# Patient Record
Sex: MALE | Race: OTHER | Employment: UNEMPLOYED | URBAN - METROPOLITAN AREA
[De-identification: names, ages, dates, MRNs, and addresses within clinical notes are randomized per-mention and may not be internally consistent; named-entity substitution may affect disease eponyms.]

---
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
## 2019-07-29 ENCOUNTER — OFFICE VISIT (OUTPATIENT)
Dept: FAMILY MEDICINE CLINIC | Age: 19
End: 2019-07-29

## 2019-07-29 VITALS
SYSTOLIC BLOOD PRESSURE: 102 MMHG | HEIGHT: 68 IN | TEMPERATURE: 97.5 F | RESPIRATION RATE: 16 BRPM | DIASTOLIC BLOOD PRESSURE: 68 MMHG | HEART RATE: 73 BPM | OXYGEN SATURATION: 98 % | WEIGHT: 152.8 LBS | BODY MASS INDEX: 23.16 KG/M2

## 2019-07-29 DIAGNOSIS — Z00.00 HEALTHY ADULT ON ROUTINE PHYSICAL EXAMINATION: Primary | ICD-10-CM

## 2019-07-29 NOTE — PROGRESS NOTES
HISTORY OF PRESENT ILLNESS  Chela Britt is a 20 y.o. male. HPI   Here for [facility]. No eye doctor. Dentist 2 x annually. No signif hx past yr. Patient Active Problem List   Diagnosis Code   (none) - all problems resolved or deleted       No Known Allergies  History reviewed. No pertinent past medical history. Past Surgical History:   Procedure Laterality Date    COLONOSCOPY N/A 4/14/2017    COLONOSCOPY performed by Laura Gonzales MD at [facility] ENDOSCOPY     Family History   Problem Relation Age of Onset    Hypertension Father     Hypertension Paternal Grandmother     Diabetes Paternal Grandmother     Cancer Paternal Grandfather         lung     Social History     Tobacco Use    Smoking status: Never Smoker    Smokeless tobacco: Never Used   Substance Use Topics    Alcohol use: No      Lab Results   Component Value Date/Time    WBC 5.9 08/02/2018 08:46 AM    HGB 13.8 08/02/2018 08:46 AM    HCT 42.4 08/02/2018 08:46 AM    PLATELET 667 08/82/7074 08:46 AM    MCV 81 08/02/2018 08:46 AM     Lab Results   Component Value Date/Time    Glucose 89 08/02/2018 08:46 AM    LDL, calculated 94 08/02/2018 08:46 AM    Creatinine 0.89 08/02/2018 08:46 AM      Lab Results   Component Value Date/Time    Cholesterol, total 158 08/02/2018 08:46 AM    HDL Cholesterol 51 08/02/2018 08:46 AM    LDL, calculated 94 08/02/2018 08:46 AM    Triglyceride 67 08/02/2018 08:46 AM     Lab Results   Component Value Date/Time    ALT (SGPT) 9 08/02/2018 08:46 AM    AST (SGOT) 17 08/02/2018 08:46 AM    Alk.  phosphatase 58 08/02/2018 08:46 AM    Bilirubin, total 0.4 08/02/2018 08:46 AM    Albumin 4.2 08/02/2018 08:46 AM    Protein, total 6.8 08/02/2018 08:46 AM    INR 1.1 04/04/2017 08:49 AM    Prothrombin time 11.1 04/04/2017 08:49 AM    PLATELET 820 97/71/2313 08:46 AM     Lab Results   Component Value Date/Time    GFR est non- 08/02/2018 08:46 AM    GFR est  08/02/2018 08:46 AM    Creatinine 0.89 08/02/2018 08:46 AM    BUN 14 08/02/2018 08:46 AM    Sodium 145 (H) 08/02/2018 08:46 AM    Potassium 4.2 08/02/2018 08:46 AM    Chloride 106 08/02/2018 08:46 AM    CO2 26 08/02/2018 08:46 AM     No results found for: TSH, TSH2, TSH3, TSHP, TSHELE, TSHEXT, TT3, T3U, T3UP, FRT3, FT3, FT4, FT4P, T4, T4P, FT4T, TT7, TSHEXT   Lab Results   Component Value Date/Time    Glucose 89 08/02/2018 08:46 AM         Review of Systems   Constitutional: Negative. HENT: Negative. Eyes: Negative. Respiratory: Negative. Cardiovascular: Negative. Gastrointestinal: Negative. Genitourinary:        Post void dribble. Musculoskeletal: Negative. Skin: Negative. Neurological: Negative. Endo/Heme/Allergies: Positive for environmental allergies. Psychiatric/Behavioral: Negative. Physical Exam   Vitals:    07/29/19 1402   BP: 102/68   Pulse: 73   Resp: 16   Temp: 97.5 °F (36.4 °C)   TempSrc: Oral   SpO2: 98%   Weight: 152 lb 12.8 oz (69.3 kg)   Height: 5' 7.5\" (1.715 m)       General  alert, cooperative, no distress, appears stated age   Head  Normocephalic, without obvious abnormality, atraumatic   Eyes  conjunctivae/corneas clear. PERRL. Fundi benign   Ears  Canals with dry cerumen. Nose Passages patent. Mucosa normal. No drainage or sinus tenderness. Throat Lips, mucosa, and tongue normal. Teeth and gums normal.  Post pharynx neg. Neck supple, nontender, no adenopathy, thyroid: not enlarged, no masses/nodules, no carotid bruits   Back   symmetric, no curvature. FROM. No CVA tenderness   Lungs   clear to auscultation bilaterally   Chest wall  no tenderness   Heart  regular rate and rhythm, no murmur, click, rub or gallop   Abdomen   soft, non-tender. Bowel sounds normal. No masses,  No organomegaly   Genitalia  Testes descended bilat w/o masses.   No hernias   Rectal  Deferred   Extremities extremities normal, atraumatic, no cyanosis or edema   Pulses 1-2+ and symmetric   Skin No rashes or lesions       Neurologic Romberg neg, nml heel, toe and Tandem gait. DTRs 1-2+ symmetric         ASSESSMENT and PLAN    ICD-10-CM ICD-9-CM    1. Healthy adult on routine physical examination Z00.00 V70.0      Follow-up and Dispositions    · Return in about 1 year (around 7/29/2020) for Annual visit.

## 2019-07-29 NOTE — PROGRESS NOTES
Chela Britt  Identified pt with two pt identifiers(name and ). Chief Complaint   Patient presents with    Complete Physical    Labs       1. Have you been to the ER, urgent care clinic since your last visit? Hospitalized since your last visit? No    2. Have you seen or consulted any other health care providers outside of the [address] [place] [address] since your last visit? Include any pap smears or colon screening. No      Would you like to sign up for MyChart today, if you have not already done so? Patient has a mychart  If not, would you like information on MyChart, and how to sign up at a later time? No      Medication reconciliation up to date and corrected with patient at this time. Today's provider has been notified of reason for visit, vitals and flowsheets obtained on patients. Reviewed record in preparation for visit, huddled with provider and have obtained necessary documentation. There are no preventive care reminders to display for this patient. Wt Readings from Last 3 Encounters:   19 152 lb 12.8 oz (69.3 kg)   18 146 lb 3.2 oz (66.3 kg) (39 %, Z= -0.28)*   18 148 lb 4.8 oz (67.3 kg) (43 %, Z= -0.18)*     * Growth percentiles are based on CDC (Boys, 2-20 Years) data.      Temp Readings from Last 3 Encounters:   19 97.5 °F (36.4 °C) (Oral)   18 98.2 °F (36.8 °C) (Oral)   18 98.1 °F (36.7 °C) (Oral)     BP Readings from Last 3 Encounters:   18 116/69   18 95/55   17 89/57     Pulse Readings from Last 3 Encounters:   19 73   18 85   18 79     Vitals:    19 1402   Pulse: 73   Resp: 16   Temp: 97.5 °F (36.4 °C)   TempSrc: Oral   SpO2: 98%   Weight: 152 lb 12.8 oz (69.3 kg)   Height: 5' 7.5\" (1.715 m)   PainSc:   0 - No pain         Learning Assessment:  :     Learning Assessment 2018   PRIMARY LEARNER Patient Patient   HIGHEST LEVEL OF EDUCATION - PRIMARY LEARNER  GRADUATED HIGH SCHOOL OR GED DID NOT GRADUATE HIGH SCHOOL   BARRIERS PRIMARY LEARNER NONE NONE   CO-LEARNER CAREGIVER No Yes   CO-LEARNER NAME - parents   JAIME Erazo 74 - 4 YEARS OF COLLEGE   BARRIERS CO-LEARNER - NONE   PRIMARY LANGUAGE ENGLISH ENGLISH   PRIMARY LANGUAGE CO-LEARNER - ENGLISH    NEED No No   LEARNER PREFERENCE PRIMARY VIDEOS PICTURES   LEARNER PREFERENCE CO-LEARNER - PICTURES   LEARNING SPECIAL TOPICS no none   ANSWERED BY self Robbin Baptiste   RELATIONSHIP SELF -   ASSESSMENT COMMENT no -       Depression Screening:  :     3 most recent PHQ Screens 8/9/2018   Little interest or pleasure in doing things Not at all   Feeling down, depressed, irritable, or hopeless Several days   Total Score PHQ 2 1       Fall Risk Assessment:  :     No flowsheet data found. Abuse Screening:  :     No flowsheet data found. ADL Screening:  :     No flowsheet data found.

## 2019-10-21 ENCOUNTER — OFFICE VISIT (OUTPATIENT)
Dept: FAMILY MEDICINE CLINIC | Age: 19
End: 2019-10-21

## 2019-10-21 VITALS
HEART RATE: 88 BPM | RESPIRATION RATE: 16 BRPM | DIASTOLIC BLOOD PRESSURE: 66 MMHG | HEIGHT: 68 IN | TEMPERATURE: 98.8 F | WEIGHT: 158.5 LBS | SYSTOLIC BLOOD PRESSURE: 97 MMHG | BODY MASS INDEX: 24.02 KG/M2 | OXYGEN SATURATION: 99 %

## 2019-10-21 DIAGNOSIS — J02.0 STREP PHARYNGITIS: Primary | ICD-10-CM

## 2019-10-21 DIAGNOSIS — J02.9 SORE THROAT: ICD-10-CM

## 2019-10-21 LAB
S PYO AG THROAT QL: POSITIVE
VALID INTERNAL CONTROL?: YES

## 2019-10-21 RX ORDER — AMOXICILLIN 875 MG/1
875 TABLET, FILM COATED ORAL 2 TIMES DAILY
Qty: 20 TAB | Refills: 0 | Status: SHIPPED | OUTPATIENT
Start: 2019-10-21 | End: 2019-10-31

## 2019-10-21 NOTE — LETTER
NOTIFICATION RETURN TO WORK / SCHOOL 
 
10/21/2019 5:17 PM 
 
Mr. Violeta Griffiths [phone] To Whom It May Concern: 
 
Violeta Griffiths is currently under the care of Masood Mansfield. He will return to work/school on: Wednesday 10/23/2019, excuse for Tuesday 10/22/2019 If there are questions or concerns please have the patient contact our office. Sincerely, Gila Candelario PA-C

## 2019-10-21 NOTE — PATIENT INSTRUCTIONS

## 2019-10-21 NOTE — PROGRESS NOTES
Sana   Identified pt with two pt identifiers(name and ). Chief Complaint   Patient presents with    Sore Throat     started this past Saturday; roommate had strep       1. Have you been to the ER, urgent care clinic since your last visit? Hospitalized since your last visit? No    2. Have you seen or consulted any other health care providers outside of the [address] [place] [address] since your last visit? Include any pap smears or colon screening. No      Would you like to sign up for MyChart today, if you have not already done so? No  If not, would you like information on MyChart, and how to sign up at a later time? No      Medication reconciliation up to date and corrected with patient at this time. Today's provider has been notified of reason for visit, vitals and flowsheets obtained on patients. Reviewed record in preparation for visit, huddled with provider and have obtained necessary documentation. Health Maintenance Due   Topic    Influenza Age 5 to Adult        Wt Readings from Last 3 Encounters:   10/21/19 158 lb 8 oz (71.9 kg)   19 152 lb 12.8 oz (69.3 kg)   18 146 lb 3.2 oz (66.3 kg) (39 %, Z= -0.28)*     * Growth percentiles are based on CDC (Boys, 2-20 Years) data.      Temp Readings from Last 3 Encounters:   10/21/19 98.8 °F (37.1 °C) (Oral)   19 97.5 °F (36.4 °C) (Oral)   18 98.2 °F (36.8 °C) (Oral)     BP Readings from Last 3 Encounters:   10/21/19 97/66   19 102/68   18 116/69     Pulse Readings from Last 3 Encounters:   10/21/19 88   19 73   18 85     Vitals:    10/21/19 1613   BP: 97/66   Pulse: 88   Resp: 16   Temp: 98.8 °F (37.1 °C)   TempSrc: Oral   SpO2: 99%   Weight: 158 lb 8 oz (71.9 kg)   Height: 5' 7.5\" (1.715 m)   PainSc:   5   PainLoc: Throat         Learning Assessment:  :     Learning Assessment 2018   PRIMARY LEARNER Patient Patient   HIGHEST LEVEL OF EDUCATION - PRIMARY LEARNER  GRADUATED HIGH SCHOOL OR GED DID NOT GRADUATE HIGH SCHOOL   BARRIERS PRIMARY LEARNER NONE NONE   CO-LEARNER CAREGIVER No Yes   CO-LEARNER NAME - parents   Ananth End LEVEL OF EDUCATION - 4 YEARS OF COLLEGE   BARRIERS CO-LEARNER - NONE   PRIMARY LANGUAGE ENGLISH ENGLISH   PRIMARY LANGUAGE CO-LEARNER - ENGLISH    NEED No No   LEARNER PREFERENCE PRIMARY VIDEOS PICTURES   LEARNER PREFERENCE CO-LEARNER - PICTURES   LEARNING SPECIAL TOPICS no none   ANSWERED BY self Robbin Baptiste   RELATIONSHIP SELF -   ASSESSMENT COMMENT no -       Depression Screening:  :     3 most recent PHQ Screens 8/9/2018   Little interest or pleasure in doing things Not at all   Feeling down, depressed, irritable, or hopeless Several days   Total Score PHQ 2 1       Fall Risk Assessment:  :     No flowsheet data found. Abuse Screening:  :     No flowsheet data found. ADL Screening:  :     No flowsheet data found.

## 2019-10-21 NOTE — PROGRESS NOTES
Subjective:   Pam Garcia is a 20 y.o. male who complains of sore throat, stuffy nose for 2 days, gradually worsening since that time. No fever. His roommate has strep. He denies a history of shortness of breath and wheezing. Tried OTC cold remedies with temporary relief (dayquil, zyrtec). No evaluation to date. History reviewed. No pertinent past medical history. Social History     Tobacco Use    Smoking status: Never Smoker    Smokeless tobacco: Never Used   Substance Use Topics    Alcohol use: No    Drug use: No       No Known Allergies     Review of Systems  A comprehensive review of systems was negative except for that written in the HPI. Objective:     Visit Vitals  BP 97/66 (BP 1 Location: Left arm, BP Patient Position: Sitting)   Pulse 88   Temp 98.8 °F (37.1 °C) (Oral)   Resp 16   Ht 5' 7.5\" (1.715 m)   Wt 158 lb 8 oz (71.9 kg)   SpO2 99%   BMI 24.46 kg/m²     General:   alert, cooperative   Eyes: conjunctivae/scleras clear. PERRL, EOM's intact   Ears: External auditory canals clear, tympanic membranes clear   Sinuses/Nose: No maxillary or frontal tenderness. clear rhinorrhea present. Mouth:  No oral lesions, + pharyngeal erythema, no exudates   Neck: Supple, trachea midline, + mild bilateral upper anterior cervical nodes   Heart: S1 and S2 normal,no murmurs noted    Lungs: Clear to auscultation bilaterally, no increased work of breathing   Extremities: No edema or cyanosis          Results for orders placed or performed in visit on 10/21/19   AMB POC RAPID STREP A   Result Value Ref Range    VALID INTERNAL CONTROL POC Yes     Group A Strep Ag Positive Negative       Assessment/Plan:       ICD-10-CM ICD-9-CM    1. Strep pharyngitis J02.0 034.0 amoxicillin (AMOXIL) 875 mg tablet   2.  Sore throat J02.9 462 AMB POC RAPID STREP A     Contagious until has 24 hours of antibiotic, letter given for school  Symptomatic therapies discussed    Orders Placed This Encounter    AMB POC RAPID STREP A    amoxicillin (AMOXIL) 875 mg tablet     Sig: Take 1 Tab by mouth two (2) times a day for 10 days. Dispense:  20 Tab     Refill:  0       Verbal and written instructions (see AVS) provided. Patient expresses understanding of diagnosis and treatment plan.

## 2020-02-29 ENCOUNTER — OFFICE VISIT (OUTPATIENT)
Dept: URGENT CARE | Age: 20
End: 2020-02-29

## 2020-02-29 VITALS
TEMPERATURE: 99.5 F | OXYGEN SATURATION: 96 % | HEART RATE: 122 BPM | RESPIRATION RATE: 20 BRPM | BODY MASS INDEX: 22.73 KG/M2 | SYSTOLIC BLOOD PRESSURE: 100 MMHG | WEIGHT: 150 LBS | HEIGHT: 68 IN | DIASTOLIC BLOOD PRESSURE: 65 MMHG

## 2020-02-29 DIAGNOSIS — B34.9 VIRAL ILLNESS: Primary | ICD-10-CM

## 2020-02-29 DIAGNOSIS — R68.89 FLU-LIKE SYMPTOMS: ICD-10-CM

## 2020-02-29 LAB
FLUAV+FLUBV AG NOSE QL IA.RAPID: NEGATIVE POS/NEG
FLUAV+FLUBV AG NOSE QL IA.RAPID: NEGATIVE POS/NEG
VALID INTERNAL CONTROL?: YES

## 2020-02-29 RX ORDER — BENZONATATE 200 MG/1
200 CAPSULE ORAL
Qty: 21 CAP | Refills: 0 | Status: SHIPPED | OUTPATIENT
Start: 2020-02-29 | End: 2020-03-07

## 2020-02-29 NOTE — PATIENT INSTRUCTIONS

## 2020-02-29 NOTE — PROGRESS NOTES
The patient presents with cold symptoms which started 2 days ago. The history is provided by the patient. Cough   The history is provided by the patient. This is a new problem. The problem occurs constantly. The problem has not changed since onset. The cough is non-productive. There has been a fever of 101 - 101.9 F. The fever has been present for less than 1 day. Associated symptoms include rhinorrhea, sore throat and myalgias. Pertinent negatives include no chills, no sweats, no ear congestion and no ear pain. He has tried decongestants for the symptoms. The treatment provided mild relief. History reviewed. No pertinent past medical history.      Past Surgical History:   Procedure Laterality Date    COLONOSCOPY N/A 4/14/2017    COLONOSCOPY performed by Richardson Morales MD at [facility] ENDOSCOPY         Family History   Problem Relation Age of Onset    Hypertension Father     Hypertension Paternal Grandmother     Diabetes Paternal Grandmother     Cancer Paternal Grandfather         lung        Social History     Socioeconomic History    Marital status: SINGLE     Spouse name: Not on file    Number of children: Not on file    Years of education: Not on file    Highest education level: Not on file   Occupational History    Not on file   Social Needs    Financial resource strain: Not on file    Food insecurity:     Worry: Not on file     Inability: Not on file    Transportation needs:     Medical: Not on file     Non-medical: Not on file   Tobacco Use    Smoking status: Never Smoker    Smokeless tobacco: Never Used   Substance and Sexual Activity    Alcohol use: No    Drug use: No    Sexual activity: Never   Lifestyle    Physical activity:     Days per week: Not on file     Minutes per session: Not on file    Stress: Not on file   Relationships    Social connections:     Talks on phone: Not on file     Gets together: Not on file     Attends Congregation service: Not on file     Active member of club or organization: Not on file     Attends meetings of clubs or organizations: Not on file     Relationship status: Not on file    Intimate partner violence:     Fear of current or ex partner: Not on file     Emotionally abused: Not on file     Physically abused: Not on file     Forced sexual activity: Not on file   Other Topics Concern    Not on file   Social History Narrative    Not on file                ALLERGIES: Patient has no known allergies. Review of Systems   Constitutional: Positive for fatigue and fever. Negative for activity change, appetite change and chills. HENT: Positive for congestion, postnasal drip, rhinorrhea and sore throat. Negative for ear pain, sinus pressure and sinus pain. Respiratory: Positive for cough. Musculoskeletal: Positive for myalgias. Vitals:    02/29/20 1404   BP: 100/65   Pulse: (!) 122   Resp: 20   Temp: 99.5 °F (37.5 °C)   SpO2: 96%   Weight: 150 lb (68 kg)   Height: 5' 7.5\" (1.715 m)       Physical Exam  Constitutional:       General: He is not in acute distress. Appearance: He is well-developed. He is not ill-appearing. HENT:      Head: Normocephalic. Right Ear: Tympanic membrane and ear canal normal. No drainage. Tympanic membrane is not erythematous or bulging. Left Ear: Tympanic membrane and ear canal normal. No drainage. Tympanic membrane is not erythematous or bulging. Nose: Nose normal. No rhinorrhea. Mouth/Throat:      Pharynx: No oropharyngeal exudate or posterior oropharyngeal erythema. Cardiovascular:      Rate and Rhythm: Normal rate and regular rhythm. Heart sounds: Normal heart sounds. Pulmonary:      Effort: Pulmonary effort is normal. No respiratory distress. Breath sounds: Normal breath sounds. No decreased breath sounds or rhonchi. Lymphadenopathy:      Cervical: No cervical adenopathy.          MDM    ICD-10-CM ICD-9-CM    1. Viral illness B34.9 079.99 benzonatate (TESSALON) 200 mg capsule 2. Flu-like symptoms R68.89 780.99 AMB POC CORAL INFLUENZA A/B TEST     Medications Ordered Today   Medications    benzonatate (TESSALON) 200 mg capsule     Sig: Take 1 Cap by mouth three (3) times daily as needed for Cough for up to 7 days. Dispense:  21 Cap     Refill:  0     No results found for any visits on 02/29/20. The patients condition was discussed with the patient and they understand. The patient is to follow up with primary care doctor. If signs and symptoms become worse the pt is to go to the ER. The patient is to take medications as prescribed. Flu negative, likely viral URI. Treat symptomatically.      Procedures

## 2020-03-02 ENCOUNTER — OFFICE VISIT (OUTPATIENT)
Dept: FAMILY MEDICINE CLINIC | Age: 20
End: 2020-03-02

## 2020-03-02 VITALS
RESPIRATION RATE: 18 BRPM | OXYGEN SATURATION: 98 % | TEMPERATURE: 97.6 F | DIASTOLIC BLOOD PRESSURE: 56 MMHG | SYSTOLIC BLOOD PRESSURE: 101 MMHG | HEIGHT: 68 IN | WEIGHT: 152 LBS | BODY MASS INDEX: 23.04 KG/M2 | HEART RATE: 99 BPM

## 2020-03-02 DIAGNOSIS — R68.89 FLU-LIKE SYMPTOMS: Primary | ICD-10-CM

## 2020-03-02 DIAGNOSIS — J02.9 SORE THROAT: ICD-10-CM

## 2020-03-02 LAB
S PYO AG THROAT QL: NEGATIVE
VALID INTERNAL CONTROL?: YES

## 2020-03-02 RX ORDER — OSELTAMIVIR PHOSPHATE 75 MG/1
75 CAPSULE ORAL 2 TIMES DAILY
Qty: 10 CAP | Refills: 0 | Status: SHIPPED | OUTPATIENT
Start: 2020-03-02 | End: 2020-03-07

## 2020-03-02 NOTE — PROGRESS NOTES
Kayla Barron is a 20 y.o. male  HIPAA verified by two patient identifiers. Health Maintenance Due   Topic    Influenza Age 5 to Adult      Chief Complaint   Patient presents with    Sore Throat     Visit Vitals  /56 (BP 1 Location: Right arm, BP Patient Position: Sitting)   Pulse 99   Temp 97.6 °F (36.4 °C) (Oral)   Resp 18   Ht 5' 7.5\" (1.715 m)   Wt 152 lb (68.9 kg)   SpO2 98%   BMI 23.46 kg/m²       Pain Scale: 2/10  Pain Location: Throat    1. Have you been to the ER, urgent care clinic since your last visit? Hospitalized since your last visit? No    2. Have you seen or consulted any other health care providers outside of the [address] [place] [address] since your last visit? Include any pap smears or colon screening.  No

## 2020-03-02 NOTE — PROGRESS NOTES
Sxs began with cough on Friday, fever that evening. HA that night and next day. Body aches Sat. AM.  Temp up to 103. Last fever 103 last night. Visit Vitals  /56 (BP 1 Location: Right arm, BP Patient Position: Sitting)   Pulse 99   Temp 97.6 °F (36.4 °C) (Oral)   Resp 18   Ht 5' 7.5\" (1.715 m)   Wt 152 lb (68.9 kg)   SpO2 98%   BMI 23.46 kg/m²     Patient alert, cooperative, reviewed above. Assessment:  1. Flu-like symptoms, presumed flu with negative flu test.    Plan:  1. Tamiflu b.i.d. for five days. 2. Treat the rest of the family for prophylaxis unless they become ill. 3. Follow otherwise here prn.

## 2023-02-23 NOTE — TELEPHONE ENCOUNTER
----- Message from Sudha Galindo sent at 5/1/2017 10:41 AM EDT -----  Regarding: Anette Pat: [phone]  Dad called to get procedure results.
Father called today for path results.     Please call [phone]
23-Feb-2023 03:25

## (undated) DEVICE — BW-412T DISP COMBO CLEANING BRUSH: Brand: SINGLE USE COMBINATION CLEANING BRUSH

## (undated) DEVICE — CONTAINER SPEC 20 ML LID NEUT BUFF FORMALIN 10 % POLYPR STS

## (undated) DEVICE — SYRINGE MED 20ML STD CLR PLAS LUERLOCK TIP N CTRL DISP

## (undated) DEVICE — SOLIDIFIER FLUID 3000 CC ABSORB

## (undated) DEVICE — KIT IV STRT W CHLORAPREP PD 1ML

## (undated) DEVICE — SET ADMIN 16ML TBNG L100IN 2 Y INJ SITE IV PIGGY BK DISP

## (undated) DEVICE — QUILTED PREMIUM COMFORT UNDERPAD,EXTRA HEAVY: Brand: WINGS

## (undated) DEVICE — 1200 GUARD II KIT W/5MM TUBE W/O VAC TUBE: Brand: GUARDIAN

## (undated) DEVICE — BAG SPEC BIOHZD LF 2MIL 6X10IN -- CONVERT TO ITEM 357326

## (undated) DEVICE — BAG BELONG PT PERS CLEAR HANDL

## (undated) DEVICE — CATH IV AUTOGRD BC BLU 22GA 25 -- INSYTE

## (undated) DEVICE — ENDO CARRY-ON PROCEDURE KIT INCLUDES ENZYMATIC SPONGE, GAUZE, BIOHAZARD LABEL, TRAY, LUBRICANT, DIRTY SCOPE LABEL, WATER LABEL, TRAY, DRAWSTRING PAD, AND DEFENDO 4-PIECE KIT.: Brand: ENDO CARRY-ON PROCEDURE KIT

## (undated) DEVICE — Z DISCONTINUED USE 2751540 TUBING IRRIG L10IN DISP PMP ENDOGATOR

## (undated) DEVICE — KENDALL RADIOLUCENT FOAM MONITORING ELECTRODE -RECTANGULAR SHAPE: Brand: KENDALL

## (undated) DEVICE — NEEDLE HYPO 18GA L1.5IN PNK S STL HUB POLYPR SHLD REG BVL

## (undated) DEVICE — AIRLIFE™ U/CONNECT-IT OXYGEN TUBING 7 FEET (2.1 M) CRUSH-RESISTANT OXYGEN TUBING, VINYL TIPPED: Brand: AIRLIFE™

## (undated) DEVICE — CONNECTOR TBNG AUX H2O JET DISP FOR OLY 160/180 SER

## (undated) DEVICE — FORCEPS BX L240CM JAW DIA2.8MM L CAP W/ NDL MIC MESH TOOTH

## (undated) DEVICE — SET EXTN TBNG L BOR 4 W STPCOCK ST 32IN PRIMING VOL 6ML